# Patient Record
Sex: MALE | Race: OTHER | HISPANIC OR LATINO
[De-identification: names, ages, dates, MRNs, and addresses within clinical notes are randomized per-mention and may not be internally consistent; named-entity substitution may affect disease eponyms.]

---
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
## 2021-08-26 ENCOUNTER — APPOINTMENT (OUTPATIENT)
Dept: NEUROLOGY | Facility: CLINIC | Age: 21
End: 2021-08-26
Payer: MEDICAID

## 2021-08-26 VITALS
SYSTOLIC BLOOD PRESSURE: 103 MMHG | BODY MASS INDEX: 27.75 KG/M2 | TEMPERATURE: 209.3 F | OXYGEN SATURATION: 98 % | WEIGHT: 147 LBS | HEART RATE: 70 BPM | HEIGHT: 61 IN | DIASTOLIC BLOOD PRESSURE: 67 MMHG

## 2021-08-26 DIAGNOSIS — R51.9 HEADACHE, UNSPECIFIED: ICD-10-CM

## 2021-08-26 DIAGNOSIS — Z82.0 FAMILY HISTORY OF EPILEPSY AND OTHER DISEASES OF THE NERVOUS SYSTEM: ICD-10-CM

## 2021-08-26 PROCEDURE — 99204 OFFICE O/P NEW MOD 45 MIN: CPT

## 2021-08-26 NOTE — HISTORY OF PRESENT ILLNESS
[FreeTextEntry1] : 20 yo man with DiGeorge syndrome presents for further evaluation of headaches. He states for the past 2 months, he has been getting headaches when it is very hot outside. He states he gets an aching left sided headache which gets up to a 5/10 pain and lasts about 20 minutes. He states he feels lightheaded with it. He reports feeling better after drinking water. He denies photophobia, phonophobia or nausea. He had an MRI brain w/o contrast at [facility] which showed mild nonspecific white matter changes. He has no further complaints.

## 2021-08-26 NOTE — PHYSICAL EXAM
[General Appearance - Alert] : alert [General Appearance - In No Acute Distress] : in no acute distress [Sclera] : the sclera and conjunctiva were normal [PERRL With Normal Accommodation] : pupils were equal in size, round, reactive to light, with normal accommodation [Outer Ear] : the ears and nose were normal in appearance [Neck Appearance] : the appearance of the neck was normal [Abnormal Walk] : normal gait [Skin Color & Pigmentation] : normal skin color and pigmentation [FreeTextEntry1] : Mental Status: AAO x3, no dysarthria, no aphasia, communicating appropriately\par CN: PERRL, EOMI, VFF, V1-V3 sensation intact, hearing grossly intact, no facial asymmetry, tongue midline\par Motor: 5/5 x 4 extremities\par Sensory: intact to light touch throughout\par Reflexes: 2+ throughout, toes equivocal bilaterally\par Coordination: no dysmetria on FTN\par Gait: steady\par \par

## 2021-08-26 NOTE — ASSESSMENT
[FreeTextEntry1] : 22 yo man with DiGeorge syndrome with headaches when hot out which improve with water likely 2/2 dehydration\par \par Headaches\par likely due to dehydration\par Patient counseled on the importance of keeping well hydrated when hot out\par \par He was advised to follow up with me as needed for new or worsening headaches

## 2021-12-16 NOTE — ED ADULT TRIAGE NOTE - CHIEF COMPLAINT QUOTE
pt reports right 4th finger nail fell off while working approx 30 mins ago. pt has finger wrapped, c/d/i. no bleeding at this time. states iced it PTA. fingers warm and mobile, cap refil brisk, + distal pulses
Fall with Harm Risk

## 2022-06-21 ENCOUNTER — INPATIENT (INPATIENT)
Facility: HOSPITAL | Age: 22
LOS: 0 days | Discharge: LEFT BEFORE DISCHARGE | DRG: 394 | End: 2022-06-21
Attending: SURGERY | Admitting: SURGERY
Payer: COMMERCIAL

## 2022-06-21 VITALS
HEART RATE: 87 BPM | OXYGEN SATURATION: 99 % | WEIGHT: 149.25 LBS | TEMPERATURE: 98 F | SYSTOLIC BLOOD PRESSURE: 100 MMHG | HEIGHT: 61 IN | RESPIRATION RATE: 16 BRPM | DIASTOLIC BLOOD PRESSURE: 64 MMHG

## 2022-06-21 VITALS
SYSTOLIC BLOOD PRESSURE: 121 MMHG | DIASTOLIC BLOOD PRESSURE: 73 MMHG | HEART RATE: 60 BPM | TEMPERATURE: 99 F | OXYGEN SATURATION: 100 % | RESPIRATION RATE: 18 BRPM

## 2022-06-21 DIAGNOSIS — Z87.730 PERSONAL HISTORY OF (CORRECTED) CLEFT LIP AND PALATE: Chronic | ICD-10-CM

## 2022-06-21 DIAGNOSIS — Z96.22 MYRINGOTOMY TUBE(S) STATUS: Chronic | ICD-10-CM

## 2022-06-21 DIAGNOSIS — K35.80 UNSPECIFIED ACUTE APPENDICITIS: ICD-10-CM

## 2022-06-21 LAB
ALBUMIN SERPL ELPH-MCNC: 5 G/DL — SIGNIFICANT CHANGE UP (ref 3.3–5.2)
ALP SERPL-CCNC: 112 U/L — SIGNIFICANT CHANGE UP (ref 40–120)
ALT FLD-CCNC: 17 U/L — SIGNIFICANT CHANGE UP
ANION GAP SERPL CALC-SCNC: 14 MMOL/L — SIGNIFICANT CHANGE UP (ref 5–17)
AST SERPL-CCNC: 15 U/L — SIGNIFICANT CHANGE UP
BASOPHILS # BLD AUTO: 0.02 K/UL — SIGNIFICANT CHANGE UP (ref 0–0.2)
BASOPHILS NFR BLD AUTO: 0.2 % — SIGNIFICANT CHANGE UP (ref 0–2)
BILIRUB SERPL-MCNC: 0.5 MG/DL — SIGNIFICANT CHANGE UP (ref 0.4–2)
BLD GP AB SCN SERPL QL: SIGNIFICANT CHANGE UP
BUN SERPL-MCNC: 13.9 MG/DL — SIGNIFICANT CHANGE UP (ref 8–20)
CALCIUM SERPL-MCNC: 9.4 MG/DL — SIGNIFICANT CHANGE UP (ref 8.6–10.2)
CHLORIDE SERPL-SCNC: 103 MMOL/L — SIGNIFICANT CHANGE UP (ref 98–107)
CO2 SERPL-SCNC: 22 MMOL/L — SIGNIFICANT CHANGE UP (ref 22–29)
CREAT SERPL-MCNC: 0.82 MG/DL — SIGNIFICANT CHANGE UP (ref 0.5–1.3)
EGFR: 128 ML/MIN/1.73M2 — SIGNIFICANT CHANGE UP
EOSINOPHIL # BLD AUTO: 0.03 K/UL — SIGNIFICANT CHANGE UP (ref 0–0.5)
EOSINOPHIL NFR BLD AUTO: 0.3 % — SIGNIFICANT CHANGE UP (ref 0–6)
GLUCOSE SERPL-MCNC: 104 MG/DL — HIGH (ref 70–99)
HCT VFR BLD CALC: 45.4 % — SIGNIFICANT CHANGE UP (ref 39–50)
HGB BLD-MCNC: 15 G/DL — SIGNIFICANT CHANGE UP (ref 13–17)
IMM GRANULOCYTES NFR BLD AUTO: 0.3 % — SIGNIFICANT CHANGE UP (ref 0–1.5)
LIDOCAIN IGE QN: 13 U/L — LOW (ref 22–51)
LYMPHOCYTES # BLD AUTO: 0.92 K/UL — LOW (ref 1–3.3)
LYMPHOCYTES # BLD AUTO: 7.9 % — LOW (ref 13–44)
MCHC RBC-ENTMCNC: 30.4 PG — SIGNIFICANT CHANGE UP (ref 27–34)
MCHC RBC-ENTMCNC: 33 GM/DL — SIGNIFICANT CHANGE UP (ref 32–36)
MCV RBC AUTO: 92.1 FL — SIGNIFICANT CHANGE UP (ref 80–100)
MONOCYTES # BLD AUTO: 0.67 K/UL — SIGNIFICANT CHANGE UP (ref 0–0.9)
MONOCYTES NFR BLD AUTO: 5.8 % — SIGNIFICANT CHANGE UP (ref 2–14)
NEUTROPHILS # BLD AUTO: 9.96 K/UL — HIGH (ref 1.8–7.4)
NEUTROPHILS NFR BLD AUTO: 85.5 % — HIGH (ref 43–77)
PLATELET # BLD AUTO: 90 K/UL — LOW (ref 150–400)
POTASSIUM SERPL-MCNC: 4.4 MMOL/L — SIGNIFICANT CHANGE UP (ref 3.5–5.3)
POTASSIUM SERPL-SCNC: 4.4 MMOL/L — SIGNIFICANT CHANGE UP (ref 3.5–5.3)
PROT SERPL-MCNC: 8.1 G/DL — SIGNIFICANT CHANGE UP (ref 6.6–8.7)
RBC # BLD: 4.93 M/UL — SIGNIFICANT CHANGE UP (ref 4.2–5.8)
RBC # FLD: 13.1 % — SIGNIFICANT CHANGE UP (ref 10.3–14.5)
SARS-COV-2 RNA SPEC QL NAA+PROBE: SIGNIFICANT CHANGE UP
SODIUM SERPL-SCNC: 139 MMOL/L — SIGNIFICANT CHANGE UP (ref 135–145)
WBC # BLD: 11.63 K/UL — HIGH (ref 3.8–10.5)
WBC # FLD AUTO: 11.63 K/UL — HIGH (ref 3.8–10.5)

## 2022-06-21 PROCEDURE — U0005: CPT

## 2022-06-21 PROCEDURE — 96375 TX/PRO/DX INJ NEW DRUG ADDON: CPT

## 2022-06-21 PROCEDURE — 80053 COMPREHEN METABOLIC PANEL: CPT

## 2022-06-21 PROCEDURE — 85025 COMPLETE CBC W/AUTO DIFF WBC: CPT

## 2022-06-21 PROCEDURE — 86901 BLOOD TYPING SEROLOGIC RH(D): CPT

## 2022-06-21 PROCEDURE — 86900 BLOOD TYPING SEROLOGIC ABO: CPT

## 2022-06-21 PROCEDURE — 83690 ASSAY OF LIPASE: CPT

## 2022-06-21 PROCEDURE — 96374 THER/PROPH/DIAG INJ IV PUSH: CPT

## 2022-06-21 PROCEDURE — 74177 CT ABD & PELVIS W/CONTRAST: CPT | Mod: MA

## 2022-06-21 PROCEDURE — 99285 EMERGENCY DEPT VISIT HI MDM: CPT

## 2022-06-21 PROCEDURE — 36415 COLL VENOUS BLD VENIPUNCTURE: CPT

## 2022-06-21 PROCEDURE — 86850 RBC ANTIBODY SCREEN: CPT

## 2022-06-21 PROCEDURE — 99285 EMERGENCY DEPT VISIT HI MDM: CPT | Mod: 25

## 2022-06-21 PROCEDURE — U0003: CPT

## 2022-06-21 PROCEDURE — 74177 CT ABD & PELVIS W/CONTRAST: CPT | Mod: 26,MA

## 2022-06-21 RX ORDER — PIPERACILLIN AND TAZOBACTAM 4; .5 G/20ML; G/20ML
3.38 INJECTION, POWDER, LYOPHILIZED, FOR SOLUTION INTRAVENOUS ONCE
Refills: 0 | Status: COMPLETED | OUTPATIENT
Start: 2022-06-21 | End: 2022-06-21

## 2022-06-21 RX ORDER — SODIUM CHLORIDE 9 MG/ML
1000 INJECTION INTRAMUSCULAR; INTRAVENOUS; SUBCUTANEOUS ONCE
Refills: 0 | Status: COMPLETED | OUTPATIENT
Start: 2022-06-21 | End: 2022-06-21

## 2022-06-21 RX ORDER — SODIUM CHLORIDE 9 MG/ML
1000 INJECTION INTRAMUSCULAR; INTRAVENOUS; SUBCUTANEOUS
Refills: 0 | Status: DISCONTINUED | OUTPATIENT
Start: 2022-06-21 | End: 2022-06-21

## 2022-06-21 RX ORDER — ACETAMINOPHEN 500 MG
650 TABLET ORAL EVERY 6 HOURS
Refills: 0 | Status: DISCONTINUED | OUTPATIENT
Start: 2022-06-21 | End: 2022-06-21

## 2022-06-21 RX ORDER — PIPERACILLIN AND TAZOBACTAM 4; .5 G/20ML; G/20ML
3.38 INJECTION, POWDER, LYOPHILIZED, FOR SOLUTION INTRAVENOUS EVERY 8 HOURS
Refills: 0 | Status: DISCONTINUED | OUTPATIENT
Start: 2022-06-21 | End: 2022-06-21

## 2022-06-21 RX ORDER — ONDANSETRON 8 MG/1
4 TABLET, FILM COATED ORAL ONCE
Refills: 0 | Status: COMPLETED | OUTPATIENT
Start: 2022-06-21 | End: 2022-06-21

## 2022-06-21 RX ORDER — ACETAMINOPHEN 500 MG
1000 TABLET ORAL ONCE
Refills: 0 | Status: COMPLETED | OUTPATIENT
Start: 2022-06-21 | End: 2022-06-21

## 2022-06-21 RX ORDER — ENOXAPARIN SODIUM 100 MG/ML
40 INJECTION SUBCUTANEOUS EVERY 24 HOURS
Refills: 0 | Status: DISCONTINUED | OUTPATIENT
Start: 2022-06-21 | End: 2022-06-21

## 2022-06-21 RX ADMIN — Medication 1000 MILLIGRAM(S): at 10:10

## 2022-06-21 RX ADMIN — ONDANSETRON 4 MILLIGRAM(S): 8 TABLET, FILM COATED ORAL at 09:56

## 2022-06-21 RX ADMIN — Medication 1000 MILLIGRAM(S): at 10:30

## 2022-06-21 RX ADMIN — SODIUM CHLORIDE 1000 MILLILITER(S): 9 INJECTION INTRAMUSCULAR; INTRAVENOUS; SUBCUTANEOUS at 10:34

## 2022-06-21 RX ADMIN — Medication 400 MILLIGRAM(S): at 09:55

## 2022-06-21 RX ADMIN — SODIUM CHLORIDE 110 MILLILITER(S): 9 INJECTION INTRAMUSCULAR; INTRAVENOUS; SUBCUTANEOUS at 17:10

## 2022-06-21 RX ADMIN — SODIUM CHLORIDE 1000 MILLILITER(S): 9 INJECTION INTRAMUSCULAR; INTRAVENOUS; SUBCUTANEOUS at 11:34

## 2022-06-21 RX ADMIN — PIPERACILLIN AND TAZOBACTAM 200 GRAM(S): 4; .5 INJECTION, POWDER, LYOPHILIZED, FOR SOLUTION INTRAVENOUS at 15:21

## 2022-06-21 NOTE — ED STATDOCS - OBJECTIVE STATEMENT
21 y/o M with PMHx DiGeorge syndrome, Thrombocytopenia, cleft lip and cleft palate, presents to the ED c/o RLQ abdominal pain with associated diarrhea and vomiting x 2 days. Pt reports abdominal pain started first. Per mother, pt was born with 1 kidney. Denies fever, dysuria.

## 2022-06-21 NOTE — ED ADULT NURSE NOTE - NSICDXPASTSURGICALHX_GEN_ALL_CORE_FT
PAST SURGICAL HISTORY:  H/O cleft palate repair 2004 at [facility]    H/O corrected cleft lip and palate 2001 cleft lip, cleft palate in 2002 and 2010 at [facility]    S/P myringotomy with insertion of tube

## 2022-06-21 NOTE — ED ADULT NURSE NOTE - OBJECTIVE STATEMENT
Pt c/o abdominal pain w/ nausea and vomiting since Sunday, denies fever/chills, hx of digeorge syndrome, AOx3, calm and cooperative, resp even and unlabored

## 2022-06-21 NOTE — CHART NOTE - NSCHARTNOTEFT_GEN_A_CORE
Patient requested to leave AMA, and signed the AMA form at 6:15 PM. Discussed with surgery attending, Dr. Bates.

## 2022-06-21 NOTE — H&P ADULT - HISTORY OF PRESENT ILLNESS
ACUTE CARE SURGERY CONSULT    Time Consult Called: 1:30  Time Consult Seen: 2:00    HPI:    21 yo male with a PMH of DiGeorge's syndrome, cleft lip, and cleft palate (s/p surgery for fixation) who is presenting to the ED with a chief complaint of abdominal pain. He states that it started approximately 2 days ago, and has worsened over the past day. It is located in the right lower quadrant, and is associated with nausea, vomiting, and diarrhea. He denies any fevers.     In the ED, labs were notable for a WBC of 11.63, and platelets of 90. CT scan demonstrated a 8 mm focal fluid distended appendiceal tip with mural hyperemia and minimal surrounding local edema.     General surgery was consulted for further management.     PAST MEDICAL HISTORY:  DiGeorge syndrome    Cleft lip and cleft palate    Single kidney    Developmental delay    Thrombocytopenia        PAST SURGICAL HISTORY:  H/O corrected cleft lip and palate    H/O cleft palate    S/P myringotomy with insertion of tube        ALLERGIES:  No Known Allergies      FAMILY HISTORY: Noncontributory    SOCIAL HISTORY: Denies tobacco, EtOH, illicit substance use.     HOME MEDICATIONS:    MEDICATIONS  (STANDING):    MEDICATIONS  (PRN):      VITALS & I/Os:  Vital Signs Last 24 Hrs  T(C): 36.6 (21 Jun 2022 08:45), Max: 36.6 (21 Jun 2022 08:45)  T(F): 97.9 (21 Jun 2022 08:45), Max: 97.9 (21 Jun 2022 08:45)  HR: 87 (21 Jun 2022 08:45) (87 - 87)  BP: 100/64 (21 Jun 2022 08:45) (100/64 - 100/64)  BP(mean): --  RR: 16 (21 Jun 2022 08:45) (16 - 16)  SpO2: 99% (21 Jun 2022 08:45) (99% - 99%)  CAPILLARY BLOOD GLUCOSE          I&O's Summary      GENERAL: Alert, well developed, in no acute distress.  MENTAL STATUS: AAOx3. Appropriate affect.  HEENT: PERRLA. EOMI. MMM.  Trachea midline. No lymph node swelling or tenderness.  RESPIRATORY: Nonlabored on RA  CARDIOVASCULAR: RRR.   GASTROINTESTINAL: Abdomen soft, non-tender  NEUROLOGIC: Cranial nerves II-XII grossly intact. No focal neurological deficits. Moves all extremities spontaneously. Sensation intact bilaterally.  INTEGUMENTARY: No overt rashes or lesions, petechia or purpura. Good turgor. No edema.  MUSCULOSKELETAL: No cyanosis or clubbing. No gross deformities.   LYMPHATIC: Palpation of neck reveals no swelling or tenderness of neck nodes. Palpation of groin reveals no swelling or tenderness of groin nodes.    LABS:                        15.0   11.63 )-----------( 90       ( 21 Jun 2022 09:52 )             45.4     06-21    139  |  103  |  13.9  ----------------------------<  104<H>  4.4   |  22.0  |  0.82    Ca    9.4      21 Jun 2022 09:52    TPro  8.1  /  Alb  5.0  /  TBili  0.5  /  DBili  x   /  AST  15  /  ALT  17  /  AlkPhos  112  06-21    Lactate:        IMAGING:      ACC: [id number] EXAM:  CT ABDOMEN AND PELVIS IC                          PROCEDURE DATE:  06/21/2022          INTERPRETATION:  CLINICAL INFORMATION: Abdominal pain. DiGeorge syndrome,    COMPARISON: None.    CONTRAST/COMPLICATIONS:  IV Contrast: Omnipaque 350  54 cc administered  Oral Contrast: NONE  Complications: None reported at time of study completion    PROCEDURE:  CT of the Abdomen and Pelvis was performed. Sagittal and coronal   reformats were performed.    FINDINGS:  LOWER CHEST: Air distended distal esophagus, nonspecific    LIVER: Within normal limits.  BILE DUCTS: Normal caliber.  GALLBLADDER: Within normal limits.  SPLEEN: Within normal limits.  PANCREAS: Within normal limits.  ADRENALS: Within normal limits.  KIDNEYS/URETERS: Absent RIGHT kidney. Hypertrophic, normal LEFT kidney   without stones or obstructive uropathy.    BLADDER: Within normal limits.  REPRODUCTIVE ORGANS: Normal prostate. Atrophic RIGHT, hypertrophic LEFT   seminal vesicle. .    BOWEL: No bowel obstruction. Predominantly normal appendix with 8mm focal   fluid distended appendiceal tip with mural hyperemia and minimal   surrounding local edema (3/103, coronal 5/43, sagittal 4/60).  PERITONEUM: Minimal/mild upper pelvic and dependent pelvic ascites  VESSELS: Within normal limits.  RETROPERITONEUM/LYMPH NODES: No lymphadenopathy.  ABDOMINAL WALL: Within normal limits.  BONES: Within normal limits.    IMPRESSION:  1.  8mm focal fluid distended appendiceal tip with mural hyperemia and   minimal surrounding local edema (3/103, coronal 5/43, sagittal 4/60)   raises the possibility of uncomplicated tip appendicitis.  2.  Minimal/mild upper pelvic and dependent pelvic ascites          --- End of Report ---            STEVE OCAMPO MD; Attending Radiologist  This document has been electronically signed. Jun 21 2022  1:08PM

## 2022-06-21 NOTE — ED STATDOCS - NSICDXPASTSURGICALHX_GEN_ALL_CORE_FT
PAST SURGICAL HISTORY:  H/O cleft palate repair 2004 at [facility]    H/O corrected cleft lip and palate 2001 cleft lip, cleft palate in 2002 and 2010 at [facility]    S/P myringotomy with insertion of tube

## 2022-06-21 NOTE — ED STATDOCS - NSICDXPASTMEDICALHX_GEN_ALL_CORE_FT
PAST MEDICAL HISTORY:  Cleft lip and cleft palate     Developmental delay     DiGeorge syndrome     Single kidney     Thrombocytopenia

## 2022-06-21 NOTE — H&P ADULT - ASSESSMENT
23 yo male with a PMH of DiGeorge's syndrome, cleft lip, and cleft palate (s/p surgery for fixation) who is presenting with signs and symptoms of acute appendicitis.     - Admit to surgery  - NPO/IVF  - IV Abx  - OR for laparoscopic appendectomy tomorrow    Discussed with Dr. Baets

## 2022-06-21 NOTE — ED ADULT NURSE REASSESSMENT NOTE - NS ED NURSE REASSESS COMMENT FT1
Pt mother requesting pt be treated at [facility] where other treatment done, AMA paperwork completed by mother and MD Moreno, IV removed, pt ambulatory safely and steadily to ED exit.

## 2022-06-21 NOTE — ED STATDOCS - ATTENDING APP SHARED VISIT CONTRIBUTION OF CARE
This was a shared visit with PRASHANTH. I reviewed and verified the documentation and independently performed the documented history/exam/mdm.

## 2022-06-21 NOTE — ED STATDOCS - PROGRESS NOTE DETAILS
PT evaluated by intake physician. HPI/PE/ROS as noted above. Will follow up plan per intake physician. Pt has fluid filled appendiceal tip. Will consult surgery.

## 2022-06-21 NOTE — ED STATDOCS - NS ED ATTENDING STATEMENT MOD
This was a shared visit with the PRASHANTH. I reviewed and verified the documentation and independently performed the documented:

## 2022-06-22 NOTE — PROGRESS NOTE ADULT - SUBJECTIVE AND OBJECTIVE BOX
HPI/OVERNIGHT EVENTS: Patient seen and examined at bedside this AM. No overnight events. No complaints. Denies fever, chills, nausea, vomiting, chest pain, SOB, dizziness, abd pain or any other concerning symptoms.    Vital Signs Last 24 Hrs  T(C): 37 (21 Jun 2022 17:23), Max: 37 (21 Jun 2022 17:23)  T(F): 98.6 (21 Jun 2022 17:23), Max: 98.6 (21 Jun 2022 17:23)  HR: 60 (21 Jun 2022 17:23) (60 - 87)  BP: 121/73 (21 Jun 2022 17:23) (100/64 - 121/73)  BP(mean): --  RR: 18 (21 Jun 2022 17:23) (16 - 18)  SpO2: 100% (21 Jun 2022 17:23) (99% - 100%)    I&O's Detail      Constitutional: patient resting comfortably in bed, in no acute distress  HEENT: MMM.  Respiratory: Non labored breathing on RA  Cardiovascular: RRR  Gastrointestinal: Abdomen soft, non-tender, non-distended, no rebound tenderness / guarding  Vascular: Extremities warm and well perfused.     LABS:                        15.0   11.63 )-----------( 90       ( 21 Jun 2022 09:52 )             45.4     06-21    139  |  103  |  13.9  ----------------------------<  104<H>  4.4   |  22.0  |  0.82    Ca    9.4      21 Jun 2022 09:52    TPro  8.1  /  Alb  5.0  /  TBili  0.5  /  DBili  x   /  AST  15  /  ALT  17  /  AlkPhos  112  06-21          MEDICATIONS  (STANDING):  enoxaparin Injectable 40 milliGRAM(s) SubCutaneous every 24 hours  piperacillin/tazobactam IVPB.. 3.375 Gram(s) IV Intermittent every 8 hours  sodium chloride 0.9%. 1000 milliLiter(s) (110 mL/Hr) IV Continuous <Continuous>    MEDICATIONS  (PRN):  acetaminophen     Tablet .. 650 milliGRAM(s) Oral every 6 hours PRN Severe Pain (7 - 10)      MICRO:   Cultures     STUDIES:   EKG, CXR, U/S, CT, MRI

## 2022-06-22 NOTE — PROGRESS NOTE ADULT - ASSESSMENT
23 yo male with a PMH of DiGeorge's syndrome, cleft lip, and cleft palate (s/p surgery for fixation) who is presenting with signs and symptoms of acute appendicitis.     - NPO/IVF  - IV Abx  - OR for laparoscopic appendectomy today

## 2022-11-02 NOTE — PACU DISCHARGE NOTE - THE ANESTHESIA ORDERS USED IN THE PACU ORDER SET WILL BE DISCONTINUED UPON TRANSFER OF THIS PATIENT
How Severe Is It?: mild Is This A New Presentation, Or A Follow-Up?: Follow Up Isotretinoin Additional History: Pt is currently on 40mg DAILY with prednisone. Tolerating well Statement Selected

## 2022-11-12 NOTE — ED ADULT NURSE NOTE - NSFALLRSKINDICATORS_ED_ALL_ED
For information on Fall & Injury Prevention, visit: [email or web address] OR  [email or web address] OR  https://www.cdc.gov/steadi/patient.html no

## 2024-10-02 NOTE — ED STATDOCS - ENMT, MLM
[FreeTextEntry1] : 2014 negative screening colonoscopy by Nabor Grover MD;   positive hemorrhoids.  Repeat in 10 years.
Nasal mucosa clear.  Mouth with normal mucosa

## 2024-11-10 NOTE — ED PROVIDER NOTE - ATTENDING CONTRIBUTION TO CARE
I personally saw the patient with the PA, and completed the key components of the history and physical exam. I then discussed the management plan with the PA.   GEN IN Nad resp clear cardic no murmur abd soft msk + ttp right 4 digits with nrom neurovasc intact   agree with pa plan of care
Imaging Studies/Medications